# Patient Record
Sex: FEMALE | Race: WHITE | NOT HISPANIC OR LATINO | Employment: OTHER | URBAN - METROPOLITAN AREA
[De-identification: names, ages, dates, MRNs, and addresses within clinical notes are randomized per-mention and may not be internally consistent; named-entity substitution may affect disease eponyms.]

---

## 2020-04-11 PROBLEM — S82.892A CLOSED LEFT ANKLE FRACTURE: Status: ACTIVE | Noted: 2020-04-11

## 2020-04-11 PROBLEM — S93.05XA ANKLE DISLOCATION, LEFT, INITIAL ENCOUNTER: Status: ACTIVE | Noted: 2020-04-11

## 2020-04-12 PROBLEM — I10 ESSENTIAL HYPERTENSION: Status: ACTIVE | Noted: 2020-04-12

## 2020-04-12 PROBLEM — E78.5 HYPERLIPIDEMIA: Chronic | Status: ACTIVE | Noted: 2020-04-12

## 2020-04-12 PROBLEM — S82.402A TIBIA/FIBULA FRACTURE, LEFT, CLOSED, INITIAL ENCOUNTER: Status: ACTIVE | Noted: 2020-04-12

## 2020-04-12 PROBLEM — E78.5 HYPERLIPIDEMIA: Status: ACTIVE | Noted: 2020-04-12

## 2020-04-12 PROBLEM — E87.1 HYPONATREMIA: Status: ACTIVE | Noted: 2020-04-12

## 2020-04-12 PROBLEM — M25.473 SOFT TISSUE SWELLING OF ANKLE JOINT: Status: ACTIVE | Noted: 2020-04-12

## 2020-04-12 PROBLEM — K21.9 GERD (GASTROESOPHAGEAL REFLUX DISEASE): Chronic | Status: ACTIVE | Noted: 2020-04-12

## 2020-04-12 PROBLEM — S82.202A TIBIA/FIBULA FRACTURE, LEFT, CLOSED, INITIAL ENCOUNTER: Status: ACTIVE | Noted: 2020-04-12

## 2020-04-12 PROBLEM — F32.A DEPRESSION: Chronic | Status: ACTIVE | Noted: 2020-04-12

## 2020-04-12 PROBLEM — I10 ESSENTIAL HYPERTENSION: Chronic | Status: ACTIVE | Noted: 2020-04-12

## 2020-04-12 PROBLEM — J44.9 COPD (CHRONIC OBSTRUCTIVE PULMONARY DISEASE): Chronic | Status: ACTIVE | Noted: 2020-04-12

## 2020-04-12 PROBLEM — F32.A DEPRESSION: Status: ACTIVE | Noted: 2020-04-12

## 2020-04-12 PROBLEM — I25.10 CORONARY ARTERY DISEASE: Chronic | Status: ACTIVE | Noted: 2020-04-12

## 2020-04-12 PROBLEM — J44.9 COPD (CHRONIC OBSTRUCTIVE PULMONARY DISEASE): Status: ACTIVE | Noted: 2020-04-12

## 2020-04-12 PROBLEM — E66.09 CLASS 2 OBESITY DUE TO EXCESS CALORIES WITH BODY MASS INDEX (BMI) OF 36.0 TO 36.9 IN ADULT: Chronic | Status: ACTIVE | Noted: 2020-04-12

## 2020-04-12 PROBLEM — I25.10 CORONARY ARTERY DISEASE: Status: ACTIVE | Noted: 2020-04-12

## 2020-04-12 PROBLEM — S82.852A TRIMALLEOLAR FRACTURE OF ANKLE, CLOSED, LEFT, INITIAL ENCOUNTER: Chronic | Status: ACTIVE | Noted: 2020-04-11

## 2020-04-12 PROBLEM — S82.302A CLOSED FRACTURE OF DISTAL END OF LEFT TIBIA: Status: ACTIVE | Noted: 2020-04-11

## 2020-04-12 PROBLEM — S82.852A TRIMALLEOLAR FRACTURE OF ANKLE, CLOSED, LEFT, INITIAL ENCOUNTER: Status: ACTIVE | Noted: 2020-04-11

## 2020-04-12 PROBLEM — K21.9 GERD (GASTROESOPHAGEAL REFLUX DISEASE): Status: ACTIVE | Noted: 2020-04-12

## 2020-04-12 PROBLEM — E66.09 CLASS 2 OBESITY DUE TO EXCESS CALORIES WITH BODY MASS INDEX (BMI) OF 36.0 TO 36.9 IN ADULT: Status: ACTIVE | Noted: 2020-04-12

## 2020-05-12 PROBLEM — S82.852D CLOSED DISPLACED TRIMALLEOLAR FRACTURE OF LEFT ANKLE WITH ROUTINE HEALING: Status: ACTIVE | Noted: 2020-05-12

## 2020-05-31 ENCOUNTER — NURSE TRIAGE (OUTPATIENT)
Dept: ADMINISTRATIVE | Facility: CLINIC | Age: 65
End: 2020-05-31

## 2020-05-31 NOTE — TELEPHONE ENCOUNTER
Called patient on behalf of [facility] post procedural covid 19 symptom tracker   Spoke with patient's daughter and daughter stated patient has not had any covid 19 symptoms since her procedure

## 2020-06-02 PROBLEM — S82.852D: Status: ACTIVE | Noted: 2020-06-02
